# Patient Record
Sex: MALE | Race: WHITE
[De-identification: names, ages, dates, MRNs, and addresses within clinical notes are randomized per-mention and may not be internally consistent; named-entity substitution may affect disease eponyms.]

---

## 2017-07-21 ENCOUNTER — HOSPITAL ENCOUNTER (OUTPATIENT)
Dept: HOSPITAL 76 - LAB.R | Age: 52
Discharge: HOME | End: 2017-07-21
Attending: PHYSICIAN ASSISTANT
Payer: COMMERCIAL

## 2017-07-21 DIAGNOSIS — J03.90: Primary | ICD-10-CM

## 2017-07-21 PROCEDURE — 87070 CULTURE OTHR SPECIMN AEROBIC: CPT

## 2019-04-17 ENCOUNTER — HOSPITAL ENCOUNTER (OUTPATIENT)
Dept: HOSPITAL 76 - DI.WCP | Age: 54
Discharge: HOME | End: 2019-04-17
Attending: PHYSICIAN ASSISTANT
Payer: COMMERCIAL

## 2019-04-17 DIAGNOSIS — M25.561: ICD-10-CM

## 2019-04-17 DIAGNOSIS — M76.52: Primary | ICD-10-CM

## 2019-04-17 PROCEDURE — 73565 X-RAY EXAM OF KNEES: CPT

## 2019-04-17 NOTE — XRAY REPORT
Reason:  KNEE PAIN,CHRONIC

Procedure Date:  04/17/2019   

Accession Number:  [id number] / [id number]                    

Procedure:  WCP - Knee 2 View BILAT CPT Code:  

 

FULL RESULT:

 

 

EXAMS:

1. Right Knee Radiography

2. Left Knee Radiography

 

EXAM DATE:4/17/2019 09:42 AM.

 

CLINICAL HISTORY:KNEE Pain, chronic.

 

COMPARISON: None.

 

TECHNIQUE: 3 views each.

 

FINDINGS:

Right Knee:

Bones: Normal. No fractures or bone lesions.

 

Joints: Normal. No effusion. No subluxations.

 

Soft Tissues: Normal. No soft tissue swelling.

 

Left Knee:

Bones: No acute abnormality. No evidence of fracture. Anterior tibial 

bone spurring at insertion site of the patellar tendon. No other bony 

abnormality.

 

Joints: Normal. No effusion. No subluxations.

 

Soft Tissues: Normal. No soft tissue swelling.

IMPRESSION:

1. Radiographically normal right knee.

2. Evidence of chronic inferior left patellar tendinitis. Otherwise 

normal left knee.

 

[facility]

## 2020-12-30 ENCOUNTER — HOSPITAL ENCOUNTER (OUTPATIENT)
Dept: HOSPITAL 76 - DI.N | Age: 55
Discharge: HOME | End: 2020-12-30
Attending: FAMILY MEDICINE
Payer: COMMERCIAL

## 2020-12-30 DIAGNOSIS — M19.011: Primary | ICD-10-CM

## 2020-12-30 NOTE — XRAY REPORT
PROCEDURE:  Shoulder 3 View RT

 

INDICATIONS:  R SHOULDER JOINT PX

 

TECHNIQUE:     3 views of the shoulder were acquired.  

 

COMPARISON:  None.

 

FINDINGS:  

 

Bones:  No fractures or dislocations.  No suspicious bony lesions.  Visualized ribs appear intact.  M
ild periarticular osteophyte formation at the acromioclavicular and glenohumeral joints.

 

Soft tissues:  No suspicious soft tissue calcifications.  

 

IMPRESSION:  Osteoarthritis. No acute fracture. No osseous lesion. If symptoms and/or clinical suspic
ion for pathology continue, further assessment with repeat plain films, or advanced imaging (e.g., CT
, MRI, or bone scan) is recommended for further assessment.

 

Reviewed by: Rogelio Lehman MD on 12/30/2020 4:57 PM PST

Approved by: Rogelio Lehman MD on 12/30/2020 4:57 PM PST

 

 

Station ID:  [id number]

## 2022-05-31 ENCOUNTER — HOSPITAL ENCOUNTER (OUTPATIENT)
Dept: HOSPITAL 76 - DI.N | Age: 57
Discharge: HOME | End: 2022-05-31
Attending: PHYSICIAN ASSISTANT
Payer: COMMERCIAL

## 2022-05-31 DIAGNOSIS — R05.9: Primary | ICD-10-CM

## 2022-06-01 NOTE — XRAY REPORT
PROCEDURE:  Chest 2 View X-Ray

 

INDICATIONS:  COUGH

 

TECHNIQUE:  2 view(s) of the chest.  

 

COMPARISON:  1/10/2019

 

FINDINGS:  

 

Surgical changes and devices:  None.  

 

Lungs and pleura:  No pleural effusions or pneumothorax.  Lungs are clear.  

 

Mediastinum:  Mediastinal contours are normal.  Heart size is normal.  

 

Bones and chest wall:  No suspicious bony abnormalities.  Flowing osteophytes throughout the thoracic
 spine. Slight pectus excavatum deformity. Soft tissues appear unremarkable.  

 

IMPRESSION:  No acute cardiopulmonary disease.

 

Reviewed by: Meme Wills MD on 6/1/2022 8:56 AM PDT

Approved by: Meme Wills MD on 6/1/2022 8:56 AM PDT

 

 

Station ID:  [id number]